# Patient Record
(demographics unavailable — no encounter records)

---

## 2024-10-16 NOTE — HISTORY OF PRESENT ILLNESS
[de-identified] : He complains of occasional chest pressure sometimes when exercising, no pain no radiation no shortness of breath    Otherwise well, no complaints, eating drinking going to the bathroom okay, no visual disturbances, no unexplained pain, no rashes, no issues with walking or balance

## 2024-10-16 NOTE — HEALTH RISK ASSESSMENT
[Very Good] : ~his/her~  mood as very good [No] : No [No falls in past year] : Patient reported no falls in the past year [0] : 2) Feeling down, depressed, or hopeless: Not at all (0) [PHQ-2 Negative - No further assessment needed] : PHQ-2 Negative - No further assessment needed [Time Spent: ___ Minutes] : I spent [unfilled] minutes performing a depression screening for this patient. [Fully functional (bathing, dressing, toileting, transferring, walking, feeding)] : Fully functional (bathing, dressing, toileting, transferring, walking, feeding) [Fully functional (using the telephone, shopping, preparing meals, housekeeping, doing laundry, using] : Fully functional and needs no help or supervision to perform IADLs (using the telephone, shopping, preparing meals, housekeeping, doing laundry, using transportation, managing medications and managing finances) [Reports normal functional visual acuity (ie: able to read med bottle)] : Reports normal functional visual acuity [With Family] : lives with family [Employed] : employed [Student] : student [Single] : single [de-identified] : None [de-identified] : 5 minutes for alcohol screening [Change in mental status noted] : No change in mental status noted [Reports changes in hearing] : Reports no changes in hearing [Reports changes in vision] : Reports no changes in vision [Reports changes in dental health] : Reports no changes in dental health

## 2024-10-16 NOTE — HISTORY OF PRESENT ILLNESS
[de-identified] : He complains of occasional chest pressure sometimes when exercising, no pain no radiation no shortness of breath    Otherwise well, no complaints, eating drinking going to the bathroom okay, no visual disturbances, no unexplained pain, no rashes, no issues with walking or balance

## 2024-10-16 NOTE — HEALTH RISK ASSESSMENT
[Very Good] : ~his/her~  mood as very good [No] : No [No falls in past year] : Patient reported no falls in the past year [0] : 2) Feeling down, depressed, or hopeless: Not at all (0) [PHQ-2 Negative - No further assessment needed] : PHQ-2 Negative - No further assessment needed [Time Spent: ___ Minutes] : I spent [unfilled] minutes performing a depression screening for this patient. [Fully functional (bathing, dressing, toileting, transferring, walking, feeding)] : Fully functional (bathing, dressing, toileting, transferring, walking, feeding) [Fully functional (using the telephone, shopping, preparing meals, housekeeping, doing laundry, using] : Fully functional and needs no help or supervision to perform IADLs (using the telephone, shopping, preparing meals, housekeeping, doing laundry, using transportation, managing medications and managing finances) [Reports normal functional visual acuity (ie: able to read med bottle)] : Reports normal functional visual acuity [With Family] : lives with family [Employed] : employed [Student] : student [Single] : single [de-identified] : None [de-identified] : 5 minutes for alcohol screening [Change in mental status noted] : No change in mental status noted [Reports changes in hearing] : Reports no changes in hearing [Reports changes in vision] : Reports no changes in vision [Reports changes in dental health] : Reports no changes in dental health

## 2025-02-06 NOTE — ASSESSMENT
[FreeTextEntry1] : Bilateral lower leg/ calf pain since november 2024.  No injury.  Left >> right.  TTP mid to medial gastroc belly.  Some tingling bilateral legs as well.  At this point, the diagnosis is uncertain and the long-term prognosis is uncertain. Possible tendonitis, possible hnp L spine, possible neuro problems.  Pmhx - denies.  Supervisor at [employer].

## 2025-02-06 NOTE — HISTORY OF PRESENT ILLNESS
[de-identified] : 02/06/2025:  BELINDA SHAMA , a24 year old male, presents today for bl calf.  Patient stated 2 months ago. Pain worse with strenuous or prolonged activity.  Left calf worse than right.

## 2025-02-06 NOTE — IMAGING
[Bilateral] :  tibia/fibula bilaterally [There are no fractures, subluxations or dislocations. No significant abnormalities are seen] : There are no fractures, subluxations or dislocations. No significant abnormalities are seen [No bony abnormalities] : No bony abnormalities

## 2025-02-06 NOTE — DISCUSSION/SUMMARY
[de-identified] : The patient's orthopaedic condition warrants consideration of intermittent use of over-the-counter medications such as advil, alleve, tylenol and similar agents.  The patient is aware to use the medications only as directed on the bottle and should contact a physician should they encounter any problems.  Will schedule the patient for an MRI of the lumbar spine to assess the problem pending insurance authorization if necessary.  The patient has continued symptoms despite various treatment modalities.  The MRI will help to clarify the diagnosis and subsequent treatment plan. Rule out HNP.   The patient has one or more conditions that would benefit from physical therapy.  The physical therapy is requested to improve any deficit in pain, range of motion, strength and other problems in the affected body part(s) as noted in the physical examination above.  A prescription for physical therapy 2 - 3 times per week for 6 weeks was prescribed for the following body parts. Bilateral legs and L-spine.   Follow up after MRI.  Will consider neuro consult if pain / tingling persists.

## 2025-02-06 NOTE — PHYSICAL EXAM
[Left] : left foot and ankle [4___] : plantar flexion 4[unfilled]/5 [Right] : right foot and ankle [] : non-antalgic [FreeTextEntry8] : TTP mid - medial gastroc belly and sol

## 2025-02-18 NOTE — PHYSICAL EXAM
[Left] : left foot and ankle [5___] : [facility] 5[unfilled]/5 [Right] : right foot and ankle [] : non-antalgic [FreeTextEntry8] : less.

## 2025-02-18 NOTE — DATA REVIEWED
[MRI] : MRI [Lumbar Spine] : lumbar spine [I independently reviewed and interpreted images and report] : I independently reviewed and interpreted images and report [FreeTextEntry1] : MRI 2/8/25 OC shows some mild disk bulging without compression.

## 2025-02-18 NOTE — HISTORY OF PRESENT ILLNESS
[de-identified] : 02/06/2025:  BELINDA SESAY , a 24 year old male with some calf pain. 2/18/25:  Symptoms improving with HEP and working with brother who is a .

## 2025-02-18 NOTE — DISCUSSION/SUMMARY
[de-identified] : The patient should perform a home exercise program as directed. The patient should focus on the body parts affected as discussed above.  The patient's orthopaedic condition warrants consideration of intermittent use of over-the-counter medications such as advil, alleve, tylenol and similar agents.  The patient is aware to use the medications only as directed on the bottle and should contact a physician should they encounter any problems.  Follow up 6 weeks if any symptoms persist.

## 2025-02-18 NOTE — ASSESSMENT
[FreeTextEntry1] : Bilateral lower leg/ calf pain since November 2024.  No injury.  MRI 2/8/25 OC shows some mild disk bulging without compression but mild foraminal stenosis.  Improved symptoms.  Seems stable at this point.  Pmhx - denies.  Supervisor at [employer].

## 2025-04-01 NOTE — PHYSICAL EXAM
[Left] : left foot and ankle [Right] : right foot and ankle [NL (0)] : extension 0 degrees [4___] : quadriceps 4[unfilled]/5 [5___] : hamstring 5[unfilled]/5 [TWNoteComboBox7] : flexion 130 degrees [] : non-antalgic [FreeTextEntry8] : less.

## 2025-04-01 NOTE — DISCUSSION/SUMMARY
[de-identified] : The patient should perform a home exercise program as directed. The patient should focus on the body parts affected as discussed above. Calf and hamstring stretching.   The patient's orthopaedic condition warrants consideration of intermittent use of over-the-counter medications such as advil, alleve, tylenol and similar agents.  The patient is aware to use the medications only as directed on the bottle and should contact a physician should they encounter any problems.  Follow up 2-3 months.  Will follow up sooner if any worsening of symptoms.

## 2025-04-01 NOTE — HISTORY OF PRESENT ILLNESS
[de-identified] : 02/06/2025:  BELINDA SESAY , a 24 year old male with some calf pain. 2/18/25:  Symptoms improving with HEP and working with brother who is a . 4/1/25: Follow up calves / spine, stated doing better, continues HEP.   Right mostly resolved.

## 2025-04-01 NOTE — ASSESSMENT
[FreeTextEntry1] : Bilateral lower leg/ calf pain since November 2024.  No injury.  MRI 2/8/25 OC shows some mild disk bulging without compression but mild foraminal stenosis. Alot of improvement with HEP and physical therapy.  Still some minor posterior pain left side.  Right mostly resolved. Still unclear diagnosis but likely tendonitis improving with physical therapy.  Negative vascular exam.  Pmhx - denies.  Supervisor at [employer].